# Patient Record
Sex: FEMALE | Race: WHITE | URBAN - METROPOLITAN AREA
[De-identification: names, ages, dates, MRNs, and addresses within clinical notes are randomized per-mention and may not be internally consistent; named-entity substitution may affect disease eponyms.]

---

## 2018-03-29 ENCOUNTER — HISTORICAL (OUTPATIENT)
Dept: ADMINISTRATIVE | Facility: HOSPITAL | Age: 68
End: 2018-03-29

## 2022-04-10 ENCOUNTER — HISTORICAL (OUTPATIENT)
Dept: ADMINISTRATIVE | Facility: HOSPITAL | Age: 72
End: 2022-04-10

## 2022-04-28 VITALS
BODY MASS INDEX: 30.05 KG/M2 | DIASTOLIC BLOOD PRESSURE: 72 MMHG | HEIGHT: 66 IN | SYSTOLIC BLOOD PRESSURE: 132 MMHG | WEIGHT: 187 LBS

## 2022-05-04 NOTE — HISTORICAL [facility] CERNER
This is a historical note converted from [place]. Formatting and pictures may have been removed.  Please reference [facility] for original formatting and attached multimedia. Chief Complaint  Pt c/o left heel pain. PT stated the pain has been going on since july. Went to Dr. Cooper and had an injection in december 2017  History of Present Illness  Having left heel pain.  She has had a history of?plantar fasciitis which was treated in the past.  She states her pain is not as intense has been the past but she is here for evaluation  Review of Systems  Review of Systems  ????Constitutional: No fever, No chills.  ????Respiratory: No shortness of breath, No cough.  ????Cardiovascular: No chest pain.  ????Gastrointestinal: No nausea, No vomiting, No diarrhea, No constipation, No heartburn.  ????Genitourinary: No dysuria, No hematuria.  ????Hematology/Lymphatics: No bleeding tendency.  ????Endocrine: No polyuria.  ????Neurologic: Alert and oriented X4, No numbness, No tingling.  ????Psychiatric: No anxiety, No depression.  Physical Exam  Vitals & Measurements  BP:?132/72?  HT:?168?cm? HT:?168?cm? WT:?84.82?kg? WT:?84.82?kg? BMI:?30.05?  left foot exam  TTP plantar fascia  intact motor and sensation  tight heel cords  high arches (pes cavus)  ?  x-rays today  no bony or acute osseous injuries  ?  Dx: plantar fasciitis  ?  NSAIDS  soft shoes at all times  home stretching  Assessment/Plan  1.?Plantar fasciitis, left  Pain of left heel   Problem List/Past Medical History  Ongoing  Acid reflux  Hyperlipidaemia  Hypertension  Hypothyroid  Plantar fasciitis, left  Historical  No qualifying data  Procedure/Surgical History  back surgery (2015), Carpal tunnel release, Hysterectomy, Tonsillotomy.  Medications  ESTRADIOL 1 MG TABLET, 1 mg= 1 tab(s), Oral, Daily  LEVOTHYROXINE 25 MCG TABLET, 25 mcg= 1 tab(s), Oral, qAM  LISINOPRIL 10 MG TABLET, 10 mg= 1 tab(s), Oral, Daily  MELOXICAM 7.5 MG TABLET, 7.5 mg= 1 tab(s), Oral,  Daily  SIMVASTATIN 40 MG TABLET  Allergies  Norco?(stomach issues)  erythromycin?(rash)  Social History  Alcohol  Never, 03/29/2018  Employment/School  Employed, 03/29/2018  Home/Environment  Lives with Spouse., 03/29/2018  Tobacco  Never smoker, 03/29/2018  Family History  Heart disease: Mother and Father.

## 2023-07-28 ENCOUNTER — PATIENT OUTREACH (OUTPATIENT)
Dept: ADMINISTRATIVE | Facility: HOSPITAL | Age: 73
End: 2023-07-28

## 2023-07-28 LAB — BCS RECOMMENDATION EXT: NORMAL
